# Patient Record
Sex: MALE | Race: OTHER | NOT HISPANIC OR LATINO | URBAN - METROPOLITAN AREA
[De-identification: names, ages, dates, MRNs, and addresses within clinical notes are randomized per-mention and may not be internally consistent; named-entity substitution may affect disease eponyms.]

---

## 2017-12-12 ENCOUNTER — EMERGENCY (EMERGENCY)
Facility: HOSPITAL | Age: 22
LOS: 1 days | Discharge: HOME | End: 2017-12-12
Attending: EMERGENCY MEDICINE | Admitting: EMERGENCY MEDICINE
Payer: COMMERCIAL

## 2017-12-12 VITALS
SYSTOLIC BLOOD PRESSURE: 138 MMHG | RESPIRATION RATE: 20 BRPM | DIASTOLIC BLOOD PRESSURE: 65 MMHG | HEART RATE: 100 BPM | TEMPERATURE: 98 F | OXYGEN SATURATION: 97 %

## 2017-12-12 VITALS
TEMPERATURE: 98 F | OXYGEN SATURATION: 95 % | RESPIRATION RATE: 20 BRPM | DIASTOLIC BLOOD PRESSURE: 76 MMHG | HEART RATE: 116 BPM | WEIGHT: 154.98 LBS | SYSTOLIC BLOOD PRESSURE: 116 MMHG
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
## 2017-12-12 DIAGNOSIS — L50.9 URTICARIA, UNSPECIFIED: ICD-10-CM

## 2017-12-12 DIAGNOSIS — Z91.013 ALLERGY TO SEAFOOD: ICD-10-CM

## 2017-12-12 DIAGNOSIS — L29.9 PRURITUS, UNSPECIFIED: ICD-10-CM

## 2017-12-12 DIAGNOSIS — R00.0 TACHYCARDIA, UNSPECIFIED: ICD-10-CM

## 2017-12-12 DIAGNOSIS — T78.40XA ALLERGY, UNSPECIFIED, INITIAL ENCOUNTER: ICD-10-CM

## 2017-12-12 PROCEDURE — 96375 TX/PRO/DX INJ NEW DRUG ADDON: CPT

## 2017-12-12 PROCEDURE — 93010 ELECTROCARDIOGRAM REPORT: CPT

## 2017-12-12 PROCEDURE — 99284 EMERGENCY DEPT VISIT MOD MDM: CPT | Mod: 25

## 2017-12-12 PROCEDURE — 96374 THER/PROPH/DIAG INJ IV PUSH: CPT

## 2017-12-12 PROCEDURE — 93005 ELECTROCARDIOGRAM TRACING: CPT

## 2017-12-12 RX ORDER — FAMOTIDINE 10 MG/ML
20 INJECTION INTRAVENOUS ONCE
Qty: 0 | Refills: 0 | Status: COMPLETED | OUTPATIENT
Start: 2017-12-12 | End: 2017-12-12

## 2017-12-12 RX ORDER — DIPHENHYDRAMINE HCL 50 MG
50 CAPSULE ORAL ONCE
Qty: 0 | Refills: 0 | Status: DISCONTINUED | OUTPATIENT
Start: 2017-12-12 | End: 2017-12-12

## 2017-12-12 RX ORDER — DIPHENHYDRAMINE HCL 50 MG
50 CAPSULE ORAL ONCE
Qty: 0 | Refills: 0 | Status: COMPLETED | OUTPATIENT
Start: 2017-12-12 | End: 2017-12-12

## 2017-12-12 RX ORDER — FAMOTIDINE 10 MG/ML
20 INJECTION INTRAVENOUS ONCE
Qty: 0 | Refills: 0 | Status: DISCONTINUED | OUTPATIENT
Start: 2017-12-12 | End: 2017-12-12

## 2017-12-12 RX ADMIN — Medication 125 MILLIGRAM(S): at 16:25

## 2017-12-12 RX ADMIN — Medication 50 MILLIGRAM(S): at 16:25

## 2017-12-12 RX ADMIN — FAMOTIDINE 20 MILLIGRAM(S): 10 INJECTION INTRAVENOUS at 16:25

## 2017-12-12 NOTE — ED ADULT NURSE NOTE - OBJECTIVE STATEMENT
pt states he ate a hot dog and thinks he is allergic.  diffuse red itching rash.  feels like something is in his throat.  no drooling.  no sob.  no wheezing.  no swollen lips or tongue.  speech clear.    pt took benadryl PTA

## 2017-12-12 NOTE — ED PROVIDER NOTE - MEDICAL DECISION MAKING DETAILS
Pt feeling much improved, states no longer has sx's and would like to go home. Pt remains HDS, repeat exam of posterior pharynx without edema, voice nml, breathing easily, rash resolved. Advised to continue benadryl at home and to take prednisone.

## 2017-12-12 NOTE — ED PROVIDER NOTE - PHYSICAL EXAMINATION
CONSTITUTIONAL: Well-appearing; well-nourished; in no apparent distress.   HEAD: Normocephalic; atraumatic.   EYES: PERRL; EOM intact; conjunctiva and sclera clear  ENT: normal nose; no rhinorrhea; minimal edema to the posterior pharynx; uvula midline and airway patent  NECK: Supple; non-tender;   CARDIOVASCULAR: Normal S1, S2; no murmurs, rubs, or gallops. Regular rate and rhythm.   RESPIRATORY: Breathing easily; breath sounds clear and equal bilaterally; no wheezes, rhonchi, or rales.  GI: Soft; non-distended; non-tender;  EXT: No cyanosis or edema; N/V intact  SKIN: Normal for age and race; warm; dry; diffuse hives over torso arms and face   NEURO: A & O x 3; face symmetric; grossly unremarkable.   PSYCHOLOGICAL: The patient’s mood and manner are appropriate.

## 2017-12-12 NOTE — ED ADULT NURSE NOTE - CHPI ED SYMPTOMS NEG
no nausea/no difficulty swallowing/no shortness of breath/no swelling of face, tongue/no vomiting/no difficulty breathing/no cough/no wheezing/no throat itching

## 2024-01-08 NOTE — ED PROVIDER NOTE - OBJECTIVE STATEMENT
Pharmacy requesting refill(s) of the populated rx(s). Pt last seen 11/28/23.   23yo man with c/o allergic reaction. PT with allergy to poultry and thinks that he ate such today. Pt has c/o hives, itching and feels a lump in his throat. Pt has no SOB, no abdominal pain. States has had similar reactions in the past and took benadryl prior to arrival. Pt has never required epi nor hospitalization and has an epi pen at home,.